# Patient Record
Sex: FEMALE | Race: WHITE | Employment: STUDENT | URBAN - METROPOLITAN AREA
[De-identification: names, ages, dates, MRNs, and addresses within clinical notes are randomized per-mention and may not be internally consistent; named-entity substitution may affect disease eponyms.]

---

## 2024-05-16 RX ORDER — NORETHINDRONE ACETATE AND ETHINYL ESTRADIOL 1MG-20(24)
1 KIT ORAL DAILY
COMMUNITY
Start: 2023-07-25

## 2024-05-16 NOTE — PROGRESS NOTES
[facility] ENDOSCOPY EGD PRE-OPERATIVE INSTRUCTIONS    Procedure date__05/21/2024_______Arrival time___0600_________        Surgery time____0700________       Nothing by mouth after midnight the night before the procedure.  This includes water chewing gum, mints and ice chips.   You may brush your teeth and gargle the morning of your surgery, but do not swallow the water    You may be asked to stop blood thinners such as Coumadin, Plavix, Fragmin, Lovenox, etc., or any anti-inflammatories such as:  Aspirin, Ibuprofen, Advil, Naproxen prior to your procedure.   We also ask that you stop any OTC medications such as fish oil, vitamin E, glucosamine, garlic, Multivitamins, COQ 10, etc.    You must make arrangements for a responsible adult to arrive with you and stay in our waiting area during your procedure.  They will also need to take you home after your procedure.   For your safety you will not be allowed to leave alone or drive yourself home.    Also for your safety, it is strongly suggested that someone stay with you the first 24 hours after your procedure.    For your comfort, please wear simple loose fitting clothing to the center.  Please do not bring valuables.      If you have a living will and a durable power of  for healthcare, please bring in a copy.    You will need to bring a photo ID and insurance card    Our goal is to provide you with excellent care so if you have any questions, please contact us at the [facility] at [phone]         Please note these are generalized instructions for all EGD cases, you may be provided with more specific instructions if necessary

## 2024-05-20 ENCOUNTER — ANESTHESIA EVENT (OUTPATIENT)
Dept: ENDOSCOPY | Age: 24
End: 2024-05-20
Payer: COMMERCIAL

## 2024-05-21 ENCOUNTER — ANESTHESIA (OUTPATIENT)
Dept: ENDOSCOPY | Age: 24
End: 2024-05-21
Payer: COMMERCIAL

## 2024-05-21 ENCOUNTER — HOSPITAL ENCOUNTER (OUTPATIENT)
Age: 24
Setting detail: OUTPATIENT SURGERY
Discharge: HOME | End: 2024-05-21
Attending: INTERNAL MEDICINE | Admitting: INTERNAL MEDICINE
Payer: COMMERCIAL

## 2024-05-21 VITALS
SYSTOLIC BLOOD PRESSURE: 104 MMHG | HEART RATE: 52 BPM | OXYGEN SATURATION: 100 % | BODY MASS INDEX: 23.22 KG/M2 | TEMPERATURE: 96.5 F | RESPIRATION RATE: 16 BRPM | HEIGHT: 61 IN | WEIGHT: 123 LBS | DIASTOLIC BLOOD PRESSURE: 58 MMHG

## 2024-05-21 DIAGNOSIS — K20.0 EOSINOPHILIC ESOPHAGITIS: ICD-10-CM

## 2024-05-21 LAB — HCG UR QL: NEGATIVE

## 2024-05-21 PROCEDURE — 7100000010 HC PHASE II RECOVERY - FIRST 15 MIN: Performed by: INTERNAL MEDICINE

## 2024-05-21 PROCEDURE — 88305 TISSUE EXAM BY PATHOLOGIST: CPT

## 2024-05-21 PROCEDURE — 3700000001 HC ADD 15 MINUTES (ANESTHESIA): Performed by: INTERNAL MEDICINE

## 2024-05-21 PROCEDURE — 7100000011 HC PHASE II RECOVERY - ADDTL 15 MIN: Performed by: INTERNAL MEDICINE

## 2024-05-21 PROCEDURE — 2580000003 HC RX 258: Performed by: ANESTHESIOLOGY

## 2024-05-21 PROCEDURE — 3609012400 HC EGD TRANSORAL BIOPSY SINGLE/MULTIPLE: Performed by: INTERNAL MEDICINE

## 2024-05-21 PROCEDURE — 2580000003 HC RX 258: Performed by: NURSE ANESTHETIST, CERTIFIED REGISTERED

## 2024-05-21 PROCEDURE — 6360000002 HC RX W HCPCS: Performed by: NURSE ANESTHETIST, CERTIFIED REGISTERED

## 2024-05-21 PROCEDURE — 3700000000 HC ANESTHESIA ATTENDED CARE: Performed by: INTERNAL MEDICINE

## 2024-05-21 PROCEDURE — 84703 CHORIONIC GONADOTROPIN ASSAY: CPT

## 2024-05-21 PROCEDURE — 88312 SPECIAL STAINS GROUP 1: CPT

## 2024-05-21 PROCEDURE — 2709999900 HC NON-CHARGEABLE SUPPLY: Performed by: INTERNAL MEDICINE

## 2024-05-21 PROCEDURE — 2500000003 HC RX 250 WO HCPCS: Performed by: NURSE ANESTHETIST, CERTIFIED REGISTERED

## 2024-05-21 RX ORDER — SODIUM CHLORIDE 9 MG/ML
INJECTION, SOLUTION INTRAVENOUS PRN
Status: DISCONTINUED | OUTPATIENT
Start: 2024-05-21 | End: 2024-05-21 | Stop reason: HOSPADM

## 2024-05-21 RX ORDER — PROPOFOL 10 MG/ML
INJECTION, EMULSION INTRAVENOUS PRN
Status: DISCONTINUED | OUTPATIENT
Start: 2024-05-21 | End: 2024-05-21 | Stop reason: SDUPTHER

## 2024-05-21 RX ORDER — SODIUM CHLORIDE 0.9 % (FLUSH) 0.9 %
5-40 SYRINGE (ML) INJECTION PRN
Status: CANCELLED | OUTPATIENT
Start: 2024-05-21

## 2024-05-21 RX ORDER — NALOXONE HYDROCHLORIDE 0.4 MG/ML
INJECTION, SOLUTION INTRAMUSCULAR; INTRAVENOUS; SUBCUTANEOUS PRN
Status: CANCELLED | OUTPATIENT
Start: 2024-05-21

## 2024-05-21 RX ORDER — ONDANSETRON 2 MG/ML
4 INJECTION INTRAMUSCULAR; INTRAVENOUS
Status: CANCELLED | OUTPATIENT
Start: 2024-05-21 | End: 2024-05-22

## 2024-05-21 RX ORDER — SODIUM CHLORIDE 9 MG/ML
INJECTION, SOLUTION INTRAVENOUS PRN
Status: CANCELLED | OUTPATIENT
Start: 2024-05-21

## 2024-05-21 RX ORDER — SODIUM CHLORIDE 9 MG/ML
INJECTION, SOLUTION INTRAVENOUS CONTINUOUS PRN
Status: DISCONTINUED | OUTPATIENT
Start: 2024-05-21 | End: 2024-05-21 | Stop reason: SDUPTHER

## 2024-05-21 RX ORDER — GLYCOPYRROLATE 0.2 MG/ML
INJECTION INTRAMUSCULAR; INTRAVENOUS PRN
Status: DISCONTINUED | OUTPATIENT
Start: 2024-05-21 | End: 2024-05-21 | Stop reason: SDUPTHER

## 2024-05-21 RX ORDER — SODIUM CHLORIDE 0.9 % (FLUSH) 0.9 %
5-40 SYRINGE (ML) INJECTION PRN
Status: DISCONTINUED | OUTPATIENT
Start: 2024-05-21 | End: 2024-05-21 | Stop reason: HOSPADM

## 2024-05-21 RX ORDER — SODIUM CHLORIDE 0.9 % (FLUSH) 0.9 %
5-40 SYRINGE (ML) INJECTION EVERY 12 HOURS SCHEDULED
Status: DISCONTINUED | OUTPATIENT
Start: 2024-05-21 | End: 2024-05-21 | Stop reason: HOSPADM

## 2024-05-21 RX ORDER — LIDOCAINE HYDROCHLORIDE 20 MG/ML
INJECTION, SOLUTION EPIDURAL; INFILTRATION; INTRACAUDAL; PERINEURAL PRN
Status: DISCONTINUED | OUTPATIENT
Start: 2024-05-21 | End: 2024-05-21 | Stop reason: SDUPTHER

## 2024-05-21 RX ORDER — SODIUM CHLORIDE 0.9 % (FLUSH) 0.9 %
5-40 SYRINGE (ML) INJECTION EVERY 12 HOURS SCHEDULED
Status: CANCELLED | OUTPATIENT
Start: 2024-05-21

## 2024-05-21 RX ORDER — FENTANYL CITRATE 50 UG/ML
INJECTION, SOLUTION INTRAMUSCULAR; INTRAVENOUS PRN
Status: DISCONTINUED | OUTPATIENT
Start: 2024-05-21 | End: 2024-05-21 | Stop reason: SDUPTHER

## 2024-05-21 RX ADMIN — GLYCOPYRROLATE 0.2 MG: 0.2 INJECTION INTRAMUSCULAR; INTRAVENOUS at 08:00

## 2024-05-21 RX ADMIN — FENTANYL CITRATE 25 MCG: 50 INJECTION INTRAMUSCULAR; INTRAVENOUS at 08:06

## 2024-05-21 RX ADMIN — PROPOFOL 100 MG: 10 INJECTION, EMULSION INTRAVENOUS at 08:07

## 2024-05-21 RX ADMIN — SODIUM CHLORIDE: 9 INJECTION, SOLUTION INTRAVENOUS at 08:01

## 2024-05-21 RX ADMIN — SODIUM CHLORIDE: 9 INJECTION, SOLUTION INTRAVENOUS at 07:53

## 2024-05-21 RX ADMIN — LIDOCAINE HYDROCHLORIDE 100 MG: 20 INJECTION, SOLUTION EPIDURAL; INFILTRATION; INTRACAUDAL; PERINEURAL at 08:07

## 2024-05-21 RX ADMIN — PROPOFOL 180 MCG/KG/MIN: 10 INJECTION, EMULSION INTRAVENOUS at 08:08

## 2024-05-21 RX ADMIN — FENTANYL CITRATE 25 MCG: 50 INJECTION INTRAMUSCULAR; INTRAVENOUS at 08:00

## 2024-05-21 ASSESSMENT — PAIN - FUNCTIONAL ASSESSMENT
PAIN_FUNCTIONAL_ASSESSMENT: NONE - DENIES PAIN
PAIN_FUNCTIONAL_ASSESSMENT: FACE, LEGS, ACTIVITY, CRY, AND CONSOLABILITY (FLACC)
PAIN_FUNCTIONAL_ASSESSMENT: 0-10
PAIN_FUNCTIONAL_ASSESSMENT: 0-10

## 2024-05-21 NOTE — ANESTHESIA PRE PROCEDURE
[facility] Department of Anesthesiology  Pre-Anesthesia Evaluation/Consultation       Name:  Tiffani Zavala  : 2000  Age:  23 y.o.                                           MRN:  [id number]  Date: 2024           Surgeon: Surgeon(s):  Marivel Lemus MD    Procedure: Procedure(s):  ESOPHAGOGASTRODUODENOSCOPY     No Known Allergies  There is no problem list on file for this patient.    Past Medical History:   Diagnosis Date    GERD (gastroesophageal reflux disease)      Past Surgical History:   Procedure Laterality Date    ENDOSCOPY, COLON, DIAGNOSTIC      WISDOM TOOTH EXTRACTION       Social History     Tobacco Use    Smoking status: Never    Smokeless tobacco: Never   Substance Use Topics    Alcohol use: Not Currently    Drug use: Yes     Types: Marijuana (Weed)     Comment: occasionally     Medications  No current facility-administered medications on file prior to encounter.     Current Outpatient Medications on File Prior to Encounter   Medication Sig Dispense Refill    Norethin Ace-Eth Estrad-FE (BLISOVI 24 FE) 1-20 MG-MCG(24) TABS Take 1 tablet by mouth daily       Current Facility-Administered Medications   Medication Dose Route Frequency Provider Last Rate Last Admin    sodium chloride flush 0.9 % injection 5-40 mL  5-40 mL IntraVENous 2 times per day Javier Blevins MD        sodium chloride flush 0.9 % injection 5-40 mL  5-40 mL IntraVENous PRN Javier Blevins MD        0.9 % sodium chloride infusion   IntraVENous PRN Javier Blevins MD         Vital Signs (Current) There were no vitals filed for this visit.  Vital Signs Statistics (for past 48 hrs)     No data recorded    BP Readings from Last 3 Encounters:   No data found for BP     BMI  Body mass index is 22.67 kg/m².  Estimated body mass index is 22.67 kg/m² as calculated from the following:    Height as of this encounter: 1.549 m (5' 1\").    Weight as of this encounter: 54.4 kg (120 lb).    CBC No results found for: \"WBC\", \"RBC\", \"HGB\", \"HCT\",

## 2024-05-21 NOTE — H&P
Gastroenterology History and Physical         Patient: Tiffani Zavala    MRN: [id number]    Sex: female    YOB: 2000    Age: 23 y.o.    Location: [facility]         Date:5/21/2024    Primary Care Physician: No primary care provider on file.             Patient: Tiffani Zavala         Physician: Marivel Lemus MD    Eosinophilic esophagitis [K20.0]  Dysphagia       Vital Signs: /77   Pulse 92   Temp 96.9 °F (36.1 °C) (Temporal)   Resp 18   Ht 1.549 m (5' 1\")   Wt 55.8 kg (123 lb)   LMP 04/30/2024 (Approximate)   SpO2 100%   BMI 23.24 kg/m²          Allergies: No Known Allergies         History of Present Illness: 23 y.o. female presents for EGD for history of eosinophilic esophagitis and recent ED visit for food impaction which resolved spontaneously.          History:    Past Medical History:   Diagnosis Date    GERD (gastroesophageal reflux disease)        Past Surgical History:   Procedure Laterality Date    ENDOSCOPY, COLON, DIAGNOSTIC      WISDOM TOOTH EXTRACTION         Social History     Socioeconomic History    Marital status: Single     Spouse name: None    Number of children: None    Years of education: None    Highest education level: None   Tobacco Use    Smoking status: Never    Smokeless tobacco: Never   Substance and Sexual Activity    Alcohol use: Not Currently    Drug use: Yes     Types: Marijuana (Weed)     Comment: occasionally       History reviewed. No pertinent family history.         Medications:   Prior to Admission medications    Medication Sig Start Date End Date Taking? Authorizing Provider   Norethin Ace-Eth Estrad-FE (BLISOVI 24 FE) 1-20 MG-MCG(24) TABS Take 1 tablet by mouth daily 7/25/23  Yes Provider, MD Katlin       ROS: the patient denies stiff neck, double vision, ataxia,  chest pain, orthopnea, sob, wheezing, productive cough    Physical Exam: I personally examined the patient.    Heart: within normal limits    Lungs: no

## 2024-05-21 NOTE — DISCHARGE INSTRUCTIONS
Endoscopy Discharge Instructions    Call Dr Lemus with any questions or concerns.    You may be drowsy or lightheaded after receiving sedation.  DO NOT operate  a vehicle (automobile, bicycle, motorcycle, machinery, or power tools), no  alcoholic beverages, and do not make any important decisions today.                 Plan on bed rest or quiet relaxation today.  Resume normal activities in the morning.     Resume normal activity tomorrow unless otherwise advised by your physician.            Eat a light first meal, avoiding spicy and fatty foods, then resume normal diet unless you are told otherwise by your physician.    If the intravenous medication site is painful, apply warm compresses on the site until the soreness is relieved and elevate the arm above the heart. Call your physician if no improvement  in 2-3 days.    Avoid NSAIDs such as ibuprofen and Aleve for two weeks after if polyps were removed.       POSSIBLE SYMPTOMS TO WATCH:     1. fever (greater than 100) 5. increased abdominal bloating   2. severe pain   6. excessive bleeding   3. nausea and vomiting  7. chest pain   4. chills    8. shortness of breath       Notify Dr Lemus if these problems occur     Expected as normal and remedies:  Sore throat: use over the counter throat lozenges or gargle with warm salt water.  Redness or soreness at the IV site: apply warm compress  Gaseous discomfort: belching or passing flatus (gas).        Impression:   Linear furrows and narrowed esophagus with appearance concerning for eosinophilic esophagitis    Recommendations:   Call for results in 15 days  Chew food carefully and slowly  Avoid meat, dry bread and rice  Repeat EGD in 2-4 weeks for dilation of the esophagus    Marivel Lemus MD, MD    With questions or concerns, call Dr Lemus at .

## 2024-05-21 NOTE — ANESTHESIA POSTPROCEDURE EVALUATION
[facility] Department of Anesthesiology  Post-Anesthesia Note       Name:  Tiffani Zavala                                  Age:  23 y.o.  MRN:  [id number]     Last Vitals & Oxygen Saturation: /60   Pulse 70   Temp (!) 96.5 °F (35.8 °C) (Temporal)   Resp 16   Ht 1.549 m (5' 1\")   Wt 55.8 kg (123 lb)   LMP 04/30/2024 (Approximate)   SpO2 100%   BMI 23.24 kg/m²   Patient Vitals for the past 4 hrs:   BP Temp Temp src Pulse Resp SpO2 Height Weight   05/21/24 0841 107/60 -- -- 70 16 100 % -- --   05/21/24 0822 (!) 89/51 (!) 96.5 °F (35.8 °C) Temporal 69 16 98 % -- --   05/21/24 0751 125/77 96.9 °F (36.1 °C) Temporal 92 18 100 % 1.549 m (5' 1\") 55.8 kg (123 lb)       Level of consciousness:  Awake, alert    Respiratory: Respirations easy, no distress. Stable.    Cardiovascular: Hemodynamically stable.    Hydration: Adequate.    PONV: Adequately managed.    Post-op pain: Adequately controlled.    Post-op assessment: Tolerated anesthetic well without complication.    Complications:  None.    Tirso Kumar MD  May 21, 2024   8:52 AM

## 2024-05-21 NOTE — OP NOTE
forward and retroflexed views  Duodenum: The first and 2nd portions of the duodenum appeared normal with normal villous pattern    Estimated Blood Loss (mL): minimal     Complications: None    Specimens:   ID Type Source Tests Collected by Time Destination   A : A. Esophageal bx's Tissue Tissue SURGICAL PATHOLOGY Marivel Lemus MD 5/21/2024 0812        The scope was then withdrawn back into the stomach, it was decompressed, and the scope was completely withdrawn.    The patient tolerated the procedure well and was taken to the post anesthesia care unit in good condition.    Impression:   Linear furrows and narrowed esophagus with appearance concerning for eosinophilic esophagitis    Recommendations:   Call for results in 15 days  Chew food carefully and slowly  Avoid meat, dry bread and rice  Repeat EGD in 2-4 weeks for dilation of the esophagus        Marivel Lemus MD  [facility]  [phone]

## (undated) DEVICE — FORCEPS BX 240CM 2.4MM L NDL RAD JAW 4 M00513334